# Patient Record
Sex: MALE | Race: WHITE | Employment: FULL TIME | URBAN - METROPOLITAN AREA
[De-identification: names, ages, dates, MRNs, and addresses within clinical notes are randomized per-mention and may not be internally consistent; named-entity substitution may affect disease eponyms.]

---

## 2019-12-21 ENCOUNTER — HOSPITAL ENCOUNTER (EMERGENCY)
Age: 64
Discharge: OTHER INSTITUTION | End: 2019-12-21
Attending: EMERGENCY MEDICINE
Payer: COMMERCIAL

## 2019-12-21 ENCOUNTER — APPOINTMENT (OUTPATIENT)
Dept: GENERAL RADIOLOGY | Age: 64
End: 2019-12-21
Attending: EMERGENCY MEDICINE
Payer: COMMERCIAL

## 2019-12-21 ENCOUNTER — APPOINTMENT (OUTPATIENT)
Dept: CT IMAGING | Age: 64
End: 2019-12-21
Attending: EMERGENCY MEDICINE
Payer: COMMERCIAL

## 2019-12-21 VITALS
HEART RATE: 110 BPM | TEMPERATURE: 102.3 F | RESPIRATION RATE: 20 BRPM | OXYGEN SATURATION: 100 % | SYSTOLIC BLOOD PRESSURE: 151 MMHG | DIASTOLIC BLOOD PRESSURE: 80 MMHG | WEIGHT: 180 LBS

## 2019-12-21 DIAGNOSIS — J69.0 ASPIRATION PNEUMONIA, UNSPECIFIED ASPIRATION PNEUMONIA TYPE, UNSPECIFIED LATERALITY, UNSPECIFIED PART OF LUNG (HCC): ICD-10-CM

## 2019-12-21 DIAGNOSIS — S06.5XAA SUBDURAL HEMATOMA: Primary | ICD-10-CM

## 2019-12-21 LAB
ALBUMIN SERPL-MCNC: 3.5 G/DL (ref 3.4–5)
ALBUMIN/GLOB SERPL: 0.6 {RATIO} (ref 0.8–1.7)
ALP SERPL-CCNC: 82 U/L (ref 45–117)
ALT SERPL-CCNC: 13 U/L (ref 16–61)
AMORPH CRY URNS QL MICRO: ABNORMAL
AMPHET UR QL SCN: NEGATIVE
ANION GAP BLD CALC-SCNC: 18 MMOL/L (ref 10–20)
ANION GAP SERPL CALC-SCNC: 8 MMOL/L (ref 3–18)
APPEARANCE UR: CLEAR
APTT PPP: 37.6 SEC (ref 23–36.4)
AST SERPL-CCNC: 18 U/L (ref 10–38)
BACTERIA URNS QL MICRO: ABNORMAL /HPF
BARBITURATES UR QL SCN: NEGATIVE
BASOPHILS # BLD: 0 K/UL (ref 0–0.1)
BASOPHILS NFR BLD: 0 % (ref 0–2)
BENZODIAZ UR QL: NEGATIVE
BILIRUB SERPL-MCNC: 1.1 MG/DL (ref 0.2–1)
BILIRUB UR QL: NEGATIVE
BUN BLD-MCNC: 19 MG/DL (ref 7–18)
BUN SERPL-MCNC: 18 MG/DL (ref 7–18)
BUN/CREAT SERPL: 13 (ref 12–20)
CA-I BLD-MCNC: 1.12 MMOL/L (ref 1.12–1.32)
CALCIUM SERPL-MCNC: 8.2 MG/DL (ref 8.5–10.1)
CANNABINOIDS UR QL SCN: NEGATIVE
CHLORIDE BLD-SCNC: 102 MMOL/L (ref 100–108)
CHLORIDE SERPL-SCNC: 102 MMOL/L (ref 100–111)
CO2 BLD-SCNC: 25 MMOL/L (ref 19–24)
CO2 SERPL-SCNC: 25 MMOL/L (ref 21–32)
COCAINE UR QL SCN: NEGATIVE
COLOR UR: YELLOW
CREAT SERPL-MCNC: 1.38 MG/DL (ref 0.6–1.3)
CREAT UR-MCNC: 1.2 MG/DL (ref 0.6–1.3)
DIFFERENTIAL METHOD BLD: ABNORMAL
EOSINOPHIL # BLD: 0 K/UL (ref 0–0.4)
EOSINOPHIL NFR BLD: 0 % (ref 0–5)
EPITH CASTS URNS QL MICRO: NEGATIVE /LPF (ref 0–5)
ERYTHROCYTE [DISTWIDTH] IN BLOOD BY AUTOMATED COUNT: 17.9 % (ref 11.6–14.5)
ETHANOL SERPL-MCNC: <3 MG/DL (ref 0–3)
GLOBULIN SER CALC-MCNC: 6 G/DL (ref 2–4)
GLUCOSE BLD STRIP.AUTO-MCNC: 202 MG/DL (ref 74–106)
GLUCOSE SERPL-MCNC: 194 MG/DL (ref 74–99)
GLUCOSE UR STRIP.AUTO-MCNC: NEGATIVE MG/DL
HCT VFR BLD AUTO: 36.2 % (ref 36–48)
HCT VFR BLD CALC: 38 % (ref 36–49)
HDSCOM,HDSCOM: ABNORMAL
HGB BLD-MCNC: 11.3 G/DL (ref 13–16)
HGB BLD-MCNC: 12.9 G/DL (ref 12–16)
HGB UR QL STRIP: NEGATIVE
HYALINE CASTS URNS QL MICRO: ABNORMAL /LPF (ref 0–2)
INR PPP: 1.3 (ref 0.8–1.2)
KETONES UR QL STRIP.AUTO: ABNORMAL MG/DL
LACTATE BLD-SCNC: 2.71 MMOL/L (ref 0.4–2)
LEUKOCYTE ESTERASE UR QL STRIP.AUTO: NEGATIVE
LYMPHOCYTES # BLD: 0.8 K/UL (ref 0.9–3.6)
LYMPHOCYTES NFR BLD: 17 % (ref 21–52)
MCH RBC QN AUTO: 26.5 PG (ref 24–34)
MCHC RBC AUTO-ENTMCNC: 31.2 G/DL (ref 31–37)
MCV RBC AUTO: 85 FL (ref 74–97)
METHADONE UR QL: NEGATIVE
MONOCYTES # BLD: 0.2 K/UL (ref 0.05–1.2)
MONOCYTES NFR BLD: 4 % (ref 3–10)
MUCOUS THREADS URNS QL MICRO: ABNORMAL /LPF
NEUTS SEG # BLD: 3.9 K/UL (ref 1.8–8)
NEUTS SEG NFR BLD: 79 % (ref 40–73)
NITRITE UR QL STRIP.AUTO: NEGATIVE
OPIATES UR QL: POSITIVE
PCP UR QL: NEGATIVE
PH UR STRIP: 5 [PH] (ref 5–8)
PLATELET # BLD AUTO: 84 K/UL (ref 135–420)
PLATELET COMMENTS,PCOM: ABNORMAL
PMV BLD AUTO: 11 FL (ref 9.2–11.8)
POTASSIUM BLD-SCNC: 3.9 MMOL/L (ref 3.5–5.5)
POTASSIUM SERPL-SCNC: 3.8 MMOL/L (ref 3.5–5.5)
PROT SERPL-MCNC: 9.5 G/DL (ref 6.4–8.2)
PROT UR STRIP-MCNC: 30 MG/DL
PROTHROMBIN TIME: 16 SEC (ref 11.5–15.2)
RBC # BLD AUTO: 4.26 M/UL (ref 4.7–5.5)
RBC #/AREA URNS HPF: ABNORMAL /HPF (ref 0–5)
RBC MORPH BLD: ABNORMAL
RBC MORPH BLD: ABNORMAL
SODIUM BLD-SCNC: 140 MMOL/L (ref 136–145)
SODIUM SERPL-SCNC: 135 MMOL/L (ref 136–145)
SP GR UR REFRACTOMETRY: 1.02 (ref 1–1.03)
TROPONIN I BLD-MCNC: <0.04 NG/ML (ref 0–0.08)
TROPONIN I SERPL-MCNC: 0.04 NG/ML (ref 0–0.04)
UROBILINOGEN UR QL STRIP.AUTO: 1 EU/DL (ref 0.2–1)
WBC # BLD AUTO: 4.9 K/UL (ref 4.6–13.2)
WBC URNS QL MICRO: ABNORMAL /HPF (ref 0–4)

## 2019-12-21 PROCEDURE — 80053 COMPREHEN METABOLIC PANEL: CPT

## 2019-12-21 PROCEDURE — 96375 TX/PRO/DX INJ NEW DRUG ADDON: CPT

## 2019-12-21 PROCEDURE — 71045 X-RAY EXAM CHEST 1 VIEW: CPT

## 2019-12-21 PROCEDURE — 80047 BASIC METABLC PNL IONIZED CA: CPT

## 2019-12-21 PROCEDURE — 80307 DRUG TEST PRSMV CHEM ANLYZR: CPT

## 2019-12-21 PROCEDURE — 84484 ASSAY OF TROPONIN QUANT: CPT

## 2019-12-21 PROCEDURE — 99285 EMERGENCY DEPT VISIT HI MDM: CPT

## 2019-12-21 PROCEDURE — 81001 URINALYSIS AUTO W/SCOPE: CPT

## 2019-12-21 PROCEDURE — 74011000258 HC RX REV CODE- 258: Performed by: EMERGENCY MEDICINE

## 2019-12-21 PROCEDURE — 74011000250 HC RX REV CODE- 250: Performed by: EMERGENCY MEDICINE

## 2019-12-21 PROCEDURE — 94002 VENT MGMT INPAT INIT DAY: CPT

## 2019-12-21 PROCEDURE — 87040 BLOOD CULTURE FOR BACTERIA: CPT

## 2019-12-21 PROCEDURE — 96365 THER/PROPH/DIAG IV INF INIT: CPT

## 2019-12-21 PROCEDURE — 85610 PROTHROMBIN TIME: CPT

## 2019-12-21 PROCEDURE — 31500 INSERT EMERGENCY AIRWAY: CPT

## 2019-12-21 PROCEDURE — 70450 CT HEAD/BRAIN W/O DYE: CPT

## 2019-12-21 PROCEDURE — 85025 COMPLETE CBC W/AUTO DIFF WBC: CPT

## 2019-12-21 PROCEDURE — 85730 THROMBOPLASTIN TIME PARTIAL: CPT

## 2019-12-21 PROCEDURE — 93005 ELECTROCARDIOGRAM TRACING: CPT

## 2019-12-21 PROCEDURE — 74011000250 HC RX REV CODE- 250

## 2019-12-21 PROCEDURE — 74011250636 HC RX REV CODE- 250/636: Performed by: EMERGENCY MEDICINE

## 2019-12-21 PROCEDURE — 74018 RADEX ABDOMEN 1 VIEW: CPT

## 2019-12-21 PROCEDURE — 83605 ASSAY OF LACTIC ACID: CPT

## 2019-12-21 PROCEDURE — 87086 URINE CULTURE/COLONY COUNT: CPT

## 2019-12-21 PROCEDURE — 74011000258 HC RX REV CODE- 258

## 2019-12-21 RX ORDER — ONDANSETRON HYDROCHLORIDE 8 MG/1
8 TABLET, FILM COATED ORAL
COMMUNITY

## 2019-12-21 RX ORDER — LORAZEPAM 1 MG/1
1 TABLET ORAL
COMMUNITY

## 2019-12-21 RX ORDER — ZOLPIDEM TARTRATE 12.5 MG/1
12.5 TABLET, FILM COATED, EXTENDED RELEASE ORAL
COMMUNITY

## 2019-12-21 RX ORDER — LOPERAMIDE HCL 2 MG
2 TABLET ORAL
COMMUNITY

## 2019-12-21 RX ORDER — PROPOFOL 10 MG/ML
INJECTION, EMULSION INTRAVENOUS
Status: COMPLETED | OUTPATIENT
Start: 2019-12-21 | End: 2019-12-21

## 2019-12-21 RX ORDER — METOCLOPRAMIDE 10 MG/1
10 TABLET ORAL
COMMUNITY

## 2019-12-21 RX ORDER — HYDROXYZINE 25 MG/1
TABLET, FILM COATED ORAL
COMMUNITY

## 2019-12-21 RX ORDER — SODIUM CHLORIDE 0.9 % (FLUSH) 0.9 %
5-10 SYRINGE (ML) INJECTION AS NEEDED
Status: DISCONTINUED | OUTPATIENT
Start: 2019-12-21 | End: 2019-12-21 | Stop reason: HOSPADM

## 2019-12-21 RX ADMIN — SODIUM CHLORIDE 1000 ML: 900 INJECTION, SOLUTION INTRAVENOUS at 16:40

## 2019-12-21 RX ADMIN — SODIUM CHLORIDE 600 MG: 9 INJECTION, SOLUTION INTRAVENOUS at 17:49

## 2019-12-21 RX ADMIN — PROPOFOL 50 MG: 10 INJECTION, EMULSION INTRAVENOUS at 15:56

## 2019-12-21 RX ADMIN — PROPOFOL 50 MG: 10 INJECTION, EMULSION INTRAVENOUS at 15:57

## 2019-12-21 NOTE — ED PROVIDER NOTES
EMERGENCY DEPARTMENT HISTORY AND PHYSICAL EXAM    4:12 PM      Date: 12/21/2019  Patient Name: Sandra Bain    History of Presenting Illness     Chief Complaint   Patient presents with    Unresponsive         History Provided By: Deejay Denise    Additional History (Context): Sandra Bain is a 62 y.o. male with malignancy who presents with unresponsive. Patient was traveling from [facility] with some friends. His friend states that she picked him up at his workshop and he had a large knot to the right forehead. The time of when the patient hit his head is unknown. She states it is a 5-1/2-hour drive and about an hour into his drive patient was not responding to verbal or tactile stimulus. Patient has a history of bone marrow cancer and has some loss of vision and is on chemotherapy at [facility] for the last 5 years according to the friend. Patient also has loss of hearing. PCP: None      Current Facility-Administered Medications   Medication Dose Route Frequency Provider Last Rate Last Dose    sodium chloride (NS) flush 5-10 mL  5-10 mL IntraVENous PRN Larry Schultz DO        clindamycin phosphate (CLEOCIN) 600 mg in 0.9% sodium chloride 100 mL IVPB  600 mg IntraVENous Q8H Larry Schultz  mL/hr at 12/21/19 1749 600 mg at 12/21/19 1749    sodium chloride 0.9 % bolus infusion 1,000 mL  1,000 mL IntraVENous ONCE Bree Schultz DO        Followed by   Mattie Jovel sodium chloride 0.9 % bolus infusion 1,000 mL  1,000 mL IntraVENous ONCE Bree Schultz DO        Followed by   Mattie Jovel sodium chloride 0.9 % bolus infusion 448 mL  448 mL IntraVENous ONCE Bree Schultzest, DO         Current Outpatient Medications   Medication Sig Dispense Refill    loperamide (IMMODIUM) 2 mg tablet Take 2 mg by mouth four (4) times daily as needed for Diarrhea.  hydrOXYzine HCl (ATARAX) 25 mg tablet Take  by mouth three (3) times daily as needed for Itching.       LORazepam (ATIVAN) 1 mg tablet Take 1 mg by mouth every four (4) hours as needed for Anxiety.  metoclopramide HCl (REGLAN) 10 mg tablet Take 10 mg by mouth Before breakfast, lunch, dinner and at bedtime.  zolpidem CR (AMBIEN CR) 12.5 mg tablet Take 12.5 mg by mouth nightly as needed for Sleep.  ondansetron hcl (ZOFRAN) 8 mg tablet Take 8 mg by mouth every eight (8) hours as needed for Nausea. Past History     Past Medical History:  No past medical history on file. Past Surgical History:  No past surgical history on file. Family History:  No family history on file. Social History:  Social History     Tobacco Use    Smoking status: Not on file   Substance Use Topics    Alcohol use: Not on file    Drug use: Not on file       Allergies: Allergies   Allergen Reactions    Dilaudid [Hydromorphone] Nausea and Vomiting         Review of Systems       Review of Systems   Unable to perform ROS: Acuity of condition         Physical Exam     Visit Vitals  /80   Pulse (!) 110   Temp (!) 102.3 °F (39.1 °C)   Resp 20   Wt 81.6 kg (180 lb)   SpO2 100%         Physical Exam  Vitals signs reviewed. Constitutional:       General: He is in acute distress. HENT:      Nose: Nose normal.   Eyes:      Pupils: Pupils are equal, round, and reactive to light. Cardiovascular:      Rate and Rhythm: Tachycardia present. Pulmonary:      Breath sounds: Rales present. Abdominal:      General: Abdomen is flat. Musculoskeletal:         General: No swelling. Right lower leg: No edema. Left lower leg: No edema. Neurological:      Mental Status: He is unresponsive. GCS: GCS eye subscore is 1. GCS verbal subscore is 1. GCS motor subscore is 1. Cranial Nerves: Cranial nerve deficit present. Motor: Weakness present.              Diagnostic Study Results     Labs -  Recent Results (from the past 12 hour(s))   POC TROPONIN-I    Collection Time: 12/21/19  3:45 PM   Result Value Ref Range    Troponin-I (POC) <0.04 0.00 - 0.08 ng/mL PTT    Collection Time: 12/21/19  3:47 PM   Result Value Ref Range    aPTT 37.6 (H) 23.0 - 36.4 SEC   PROTHROMBIN TIME + INR    Collection Time: 12/21/19  3:47 PM   Result Value Ref Range    Prothrombin time 16.0 (H) 11.5 - 15.2 sec    INR 1.3 (H) 0.8 - 1.2     CBC WITH AUTOMATED DIFF    Collection Time: 12/21/19  3:47 PM   Result Value Ref Range    WBC 4.9 4.6 - 13.2 K/uL    RBC 4.26 (L) 4.70 - 5.50 M/uL    HGB 11.3 (L) 13.0 - 16.0 g/dL    HCT 36.2 36.0 - 48.0 %    MCV 85.0 74.0 - 97.0 FL    MCH 26.5 24.0 - 34.0 PG    MCHC 31.2 31.0 - 37.0 g/dL    RDW 17.9 (H) 11.6 - 14.5 %    PLATELET 84 (L) 279 - 420 K/uL    MPV 11.0 9.2 - 11.8 FL    NEUTROPHILS 79 (H) 40 - 73 %    LYMPHOCYTES 17 (L) 21 - 52 %    MONOCYTES 4 3 - 10 %    EOSINOPHILS 0 0 - 5 %    BASOPHILS 0 0 - 2 %    ABS. NEUTROPHILS 3.9 1.8 - 8.0 K/UL    ABS. LYMPHOCYTES 0.8 (L) 0.9 - 3.6 K/UL    ABS. MONOCYTES 0.2 0.05 - 1.2 K/UL    ABS. EOSINOPHILS 0.0 0.0 - 0.4 K/UL    ABS. BASOPHILS 0.0 0.0 - 0.1 K/UL    DF AUTOMATED      PLATELET COMMENTS DECREASED PLATELETS      RBC COMMENTS ANISOCYTOSIS  1+        RBC COMMENTS POLYCHROMASIA  PRESENT       METABOLIC PANEL, COMPREHENSIVE    Collection Time: 12/21/19  3:47 PM   Result Value Ref Range    Sodium 135 (L) 136 - 145 mmol/L    Potassium 3.8 3.5 - 5.5 mmol/L    Chloride 102 100 - 111 mmol/L    CO2 25 21 - 32 mmol/L    Anion gap 8 3.0 - 18 mmol/L    Glucose 194 (H) 74 - 99 mg/dL    BUN 18 7.0 - 18 MG/DL    Creatinine 1.38 (H) 0.6 - 1.3 MG/DL    BUN/Creatinine ratio 13 12 - 20      GFR est AA >60 >60 ml/min/1.73m2    GFR est non-AA 52 (L) >60 ml/min/1.73m2    Calcium 8.2 (L) 8.5 - 10.1 MG/DL    Bilirubin, total 1.1 (H) 0.2 - 1.0 MG/DL    ALT (SGPT) 13 (L) 16 - 61 U/L    AST (SGOT) 18 10 - 38 U/L    Alk.  phosphatase 82 45 - 117 U/L    Protein, total 9.5 (H) 6.4 - 8.2 g/dL    Albumin 3.5 3.4 - 5.0 g/dL    Globulin 6.0 (H) 2.0 - 4.0 g/dL    A-G Ratio 0.6 (L) 0.8 - 1.7     TROPONIN I    Collection Time: 12/21/19  3:47 PM   Result Value Ref Range    Troponin-I, QT 0.04 0.0 - 0.045 NG/ML   ETHYL ALCOHOL    Collection Time: 12/21/19  3:47 PM   Result Value Ref Range    ALCOHOL(ETHYL),SERUM <3 0 - 3 MG/DL   POC CHEM8    Collection Time: 12/21/19  3:47 PM   Result Value Ref Range    CO2, POC 25 (H) 19 - 24 MMOL/L    Glucose,  (H) 74 - 106 MG/DL    BUN, POC 19 (H) 7 - 18 MG/DL    Creatinine, POC 1.2 0.6 - 1.3 MG/DL    GFRAA, POC >60 >60 ml/min/1.73m2    GFRNA, POC >60 >60 ml/min/1.73m2    Sodium,  136 - 145 MMOL/L    Potassium, POC 3.9 3.5 - 5.5 MMOL/L    Calcium, ionized (POC) 1.12 1.12 - 1.32 mmol/L    Chloride,  100 - 108 MMOL/L    Anion gap, POC 18 10 - 20      Hematocrit, POC 38 36 - 49 %    Hemoglobin, POC 12.9 12 - 16 G/DL   EKG, 12 LEAD, INITIAL    Collection Time: 12/21/19  3:48 PM   Result Value Ref Range    Ventricular Rate 128 BPM    Atrial Rate 129 BPM    QRS Duration 128 ms    Q-T Interval 426 ms    QTC Calculation (Bezet) 621 ms    Calculated P Axis 48 degrees    Calculated R Axis 20 degrees    Calculated T Axis 78 degrees    Diagnosis       Ventricular-paced rhythm with fusion complexes  Abnormal ECG  No previous ECGs available     DRUG SCREEN, URINE    Collection Time: 12/21/19  4:35 PM   Result Value Ref Range    BENZODIAZEPINES NEGATIVE  NEG      BARBITURATES NEGATIVE  NEG      THC (TH-CANNABINOL) NEGATIVE  NEG      OPIATES POSITIVE (A) NEG      PCP(PHENCYCLIDINE) NEGATIVE  NEG      COCAINE NEGATIVE  NEG      AMPHETAMINES NEGATIVE  NEG      METHADONE NEGATIVE  NEG      HDSCOM (NOTE)    URINALYSIS W/ RFLX MICROSCOPIC    Collection Time: 12/21/19  4:35 PM   Result Value Ref Range    Color YELLOW      Appearance CLEAR      Specific gravity 1.019 1.005 - 1.030      pH (UA) 5.0 5.0 - 8.0      Protein 30 (A) NEG mg/dL    Glucose NEGATIVE  NEG mg/dL    Ketone TRACE (A) NEG mg/dL    Bilirubin NEGATIVE  NEG      Blood NEGATIVE  NEG      Urobilinogen 1.0 0.2 - 1.0 EU/dL    Nitrites NEGATIVE  NEG Leukocyte Esterase NEGATIVE  NEG     URINE MICROSCOPIC ONLY    Collection Time: 12/21/19  4:35 PM   Result Value Ref Range    WBC 4 to 11 0 - 4 /hpf    RBC 0 to 3 0 - 5 /hpf    Epithelial cells NEGATIVE  0 - 5 /lpf    Bacteria 1+ (A) NEG /hpf    Mucus 1+ (A) NEG /lpf    Amorphous Crystals 2+ (A) NEG    Hyaline cast 4 to 11 0 - 2 /lpf   POC LACTIC ACID    Collection Time: 12/21/19  4:55 PM   Result Value Ref Range    Lactic Acid (POC) 2.71 (HH) 0.40 - 2.00 mmol/L       Radiologic Studies -   CT HEAD WO CONT   Final Result   IMPRESSION:      1. Bilateral acute subdural hematomas, left greater than right with resultant   left to right midline shift. There is also subdural blood products along the   falx and the tentorium. Mild enlargement of the right lateral ventricle which   may be due to a trapped ventricle. 2.  Complex area in the dorys and midbrain which contains areas of hemorrhage. 3.  Complete effacement of the basilar cisterns consistent with herniation. 4.   Diffuse effacement of the sulci and some poor definition of gray-white   differentiation throughout but more prominent in the cerebellum and temporal   lobes and brainstem concerning for diffuse edema   5. Findings discussed with Dr. Garrel Boas. XR CHEST PORT    (Results Pending)   XR ABD (KUB)    (Results Pending)         Medical Decision Making   Provider Notes (Medical Decision Making):  MDM  Number of Diagnoses or Management Options  Aspiration pneumonia, unspecified aspiration pneumonia type, unspecified laterality, unspecified part of lung [facility]):   Subdural hematoma [facility]):   Diagnosis management comments: Intracranial bleed  Aspiration   Respiratory distress        I am the first provider for this patient. I reviewed the vital signs, available nursing notes, past medical history, past surgical history, family history and social history. Vital Signs-Reviewed the patient's vital signs.         ED Course: Progress Notes, Reevaluation, and Consults:        Intubation  Date/Time: 12/21/2019 3:53 PM  Performed by: Gabriela Pham DO  Authorized by: Gabriela Pham DO     Consent:     Consent obtained:  Emergent situation    Alternatives discussed:  No treatment  Pre-procedure details:     Patient status:  Unresponsive    Mallampati score:  II    Pretreatment medications:  None    Paralytics:  None  Procedure details:     Preoxygenation:  Nonrebreather mask    CPR in progress: no      Intubation method:  Oral    Oral intubation technique:  Direct and video-assisted    Laryngoscope blade: Mac 3    Tube size (mm):  8.5    Tube type:  Cuffed    Number of attempts:  1    Ventilation between attempts: no      Cricoid pressure: no      Tube visualized through cords: yes    Placement assessment:     ETT to lip:  22    ETT to teeth:  21    Tube secured with: Adhesive tape and ETT fleming    Breath sounds:  Equal and absent over the epigastrium    Placement verification: chest rise, condensation, CXR verification, direct visualization, equal breath sounds and ETCO2 detector      CXR findings:  ETT in proper place  Post-procedure details:     Patient tolerance of procedure: Tolerated well, no immediate complications      Labs essentially normal with the exception of hgb 11.3, Plt 84. Trop neg, etoh neg,   Chest X-Ray showed No acute process. EKG showed paced rhythm with a rate of 128 bpm. With no ST elevations or depression and non specific T wave changes. CT Head reviewed. 4:52 PM 12/21/2019    4:53PM Discussed with patient's wife. Consult:  Discussed care with ALEJANDRA Curtis, Neurosurgery  Standard discussion; including history of patients chief complaint, available diagnostic results, and treatment course. Agrees with ED to ED transfer. 5:04 PM    Consult:  Discussed care with Dr Anabel Kelly, [facility] ED physician.  Standard discussion; including history of patients chief complaint, available diagnostic results, and treatment course. Accepts the patient for ED to ED transfer. 5:12 PM    Consult:  Discussed care with Dr Melina Rodriguez. Standard discussion; including history of patients chief complaint, available diagnostic results, and treatment course. Will see patient in ED.   5:19 PM        CRITICAL CARE:  4:29 PM  I have spent 95 minutes of critical care time involved in lab review, consultations with specialist, family decision-making, and documentation. During this entire length of time I was immediately available to the patient. Critical Care: The reason for providing this level of medical care for this critically ill patient was due a critical illness that impaired one or more vital organ systems such that there was a high probability of imminent or life threatening deterioration in the patients condition. This care involved high complexity decision making to assess, manipulate, and support vital system functions, to treat this degreee vital organ system failure and to prevent further life threatening deterioration of the patients condition. Patient transferred in stable condition. 6:03PM      Diagnosis       Clinical Impression:   1. Subdural hematoma ([address]    2. Aspiration pneumonia, unspecified aspiration pneumonia type, unspecified laterality, unspecified part of lung ([address]        Disposition: Transferred to [place]:     I personally performed the services described in the documentation, reviewed the documentation and it accurately and completely records my words and actions utilizing the [employer] December 21, 2019 at 6:02 PM - Clarence Schultz. It is dictated using utilizing voice recognition software. Unfortunately this leads to occasional typographical errors. I apologize in advance if the situation occurs. If questions arise please do not hesitate to contact me or call our department.

## 2019-12-21 NOTE — ED TRIAGE NOTES
Per the patient's friend, Jason Colindres has cancer. He's been having some side effects from the chemo. He fell last night. He lives in [place] and came here to visit me in [facility]. He fell asleep in the car shortly after he got in the car to come here. \"

## 2019-12-22 LAB
ATRIAL RATE: 129 BPM
CALCULATED P AXIS, ECG09: 48 DEGREES
CALCULATED R AXIS, ECG10: 20 DEGREES
CALCULATED T AXIS, ECG11: 78 DEGREES
DIAGNOSIS, 93000: NORMAL
Q-T INTERVAL, ECG07: 426 MS
QRS DURATION, ECG06: 128 MS
QTC CALCULATION (BEZET), ECG08: 621 MS
VENTRICULAR RATE, ECG03: 128 BPM

## 2019-12-23 LAB
BACTERIA SPEC CULT: NORMAL
SERVICE CMNT-IMP: NORMAL

## 2019-12-27 LAB
BACTERIA SPEC CULT: NORMAL
BACTERIA SPEC CULT: NORMAL
SERVICE CMNT-IMP: NORMAL
SERVICE CMNT-IMP: NORMAL